# Patient Record
Sex: MALE | Race: WHITE | NOT HISPANIC OR LATINO | Employment: UNEMPLOYED | URBAN - METROPOLITAN AREA
[De-identification: names, ages, dates, MRNs, and addresses within clinical notes are randomized per-mention and may not be internally consistent; named-entity substitution may affect disease eponyms.]

---

## 2017-10-13 ENCOUNTER — OFFICE VISIT (OUTPATIENT)
Dept: NEUROLOGY | Facility: CLINIC | Age: 27
End: 2017-10-13

## 2017-10-13 VITALS — WEIGHT: 200 LBS | BODY MASS INDEX: 26.51 KG/M2 | HEIGHT: 73 IN

## 2017-10-13 DIAGNOSIS — R56.9 GENERALIZED CONVULSIVE SEIZURES (HCC): Primary | ICD-10-CM

## 2017-10-13 PROCEDURE — 99213 OFFICE O/P EST LOW 20 MIN: CPT | Performed by: PSYCHIATRY & NEUROLOGY

## 2017-10-13 RX ORDER — ALPRAZOLAM 2 MG/1
2 TABLET ORAL AS NEEDED
COMMUNITY
Start: 2017-08-12 | End: 2021-02-02

## 2017-10-13 RX ORDER — DIVALPROEX SODIUM 125 MG/1
625 CAPSULE, COATED PELLETS ORAL 2 TIMES DAILY
Qty: 900 CAPSULE | Refills: 3 | Status: SHIPPED | OUTPATIENT
Start: 2017-10-13 | End: 2018-10-12 | Stop reason: SDUPTHER

## 2017-10-13 NOTE — PROGRESS NOTES
Subjective:     Patient ID: Albaro Alvarado is a 27 y.o. male.    History of Present Illness     The patient has had a few more seizures recently.  However Geodon was increased.  No side effects from medication.  Is taken from the patient's parents.  The following portions of the patient's history were reviewed and updated as appropriate: allergies, current medications, past family history, past medical history, past social history, past surgical history and problem list.      Current Outpatient Prescriptions:   •  ALPRAZolam (XANAX) 2 MG tablet, 2 mg As Needed., Disp: , Rfl:   •  Divalproex Sodium (DEPAKOTE SPRINKLE) 125 MG capsule, SPRINKLE 4 CAPSULES BY MOUTH TWO TIMES A DAY AS DIRECTED BY PHYSICIAN, Disp: 720 capsule, Rfl: 2  •  ziprasidone (GEODON) 20 MG capsule, 20 mg 2 (Two) Times a Day With Meals., Disp: , Rfl:     Review of Systems   Constitutional: Negative.    Neurological: Positive for seizures. Negative for dizziness, tremors, syncope, facial asymmetry, speech difficulty, weakness, light-headedness, numbness and headaches.   Psychiatric/Behavioral: Negative.         Objective:    Neurologic Exam  He was examined in the family's minivan.  He is aggressive and mentally handicapped.  Nonverbal.  No pattern of focal weakness.  No tremor is noted.  Self injurious behavior.    Physical Exam    Assessment/Plan:     Albaro was seen today for seizures.    Diagnoses and all orders for this visit:    Generalized convulsive seizures       As his seizures have increased I will try to increase a coat.    Prescription drug management - Depakote 625 mg twice daily sprinkles.  May reduce if problematic.    Follow-up in the office in one year. Thank you for allowing me to share in the care of this patient.  Yeyo Ramirez M.D.

## 2018-10-12 ENCOUNTER — OFFICE VISIT (OUTPATIENT)
Dept: NEUROLOGY | Facility: CLINIC | Age: 28
End: 2018-10-12

## 2018-10-12 DIAGNOSIS — R56.9 GENERALIZED CONVULSIVE SEIZURES (HCC): Primary | ICD-10-CM

## 2018-10-12 PROCEDURE — 99213 OFFICE O/P EST LOW 20 MIN: CPT | Performed by: PSYCHIATRY & NEUROLOGY

## 2018-10-12 RX ORDER — DIVALPROEX SODIUM 125 MG/1
625 CAPSULE, COATED PELLETS ORAL EVERY 12 HOURS SCHEDULED
Qty: 900 CAPSULE | Refills: 3 | Status: SHIPPED | OUTPATIENT
Start: 2018-10-12 | End: 2019-10-11 | Stop reason: SDUPTHER

## 2018-10-12 NOTE — PROGRESS NOTES
Subjective:     Patient ID: Albaro Alvarado is a 28 y.o. male.    Seizures      The patient was seen for follow-up of seizures.  He has a few breakthroughs.  He is having no side effects from the medication.  History was taken from the mother.  Recent valproic acid level was 76.  Liver function tests were normal as was CBC.  The family wishes to make no change.      The following portions of the patient's history were reviewed and updated as appropriate: allergies, current medications, past family history, past medical history, past social history, past surgical history and problem list.      Current Outpatient Prescriptions:   •  ALPRAZolam (XANAX) 2 MG tablet, 2 mg As Needed., Disp: , Rfl:   •  Divalproex Sodium (DEPAKOTE SPRINKLE) 125 MG capsule, Take 5 capsules by mouth Every 12 (Twelve) Hours., Disp: 900 capsule, Rfl: 3  •  ziprasidone (GEODON) 20 MG capsule, 20 mg 2 (Two) Times a Day With Meals., Disp: , Rfl:     Review of Systems   Unable to perform ROS: Psychiatric disorder   Neurological: Positive for seizures.        Objective:    Neurologic Exam  The patient is mentally handicapped.  Nonverbal.  Examined in his minivan.  Funduscopy, eye movements and pupillary reflexes were normal.  Visual fields were symmetric to opticokinetic nystagmus tape  No facial weakness noted.  No pattern of focal weakness.  Gait was unremarkable.  Physical Exam    Assessment/Plan:     Albaro was seen today for seizures.    Diagnoses and all orders for this visit:    Generalized convulsive seizures (CMS/HCC)    Other orders  -     Divalproex Sodium (DEPAKOTE SPRINKLE) 125 MG capsule; Take 5 capsules by mouth Every 12 (Twelve) Hours.         Prescription drug management - meds as above    Follow-up in the office in one year. Thank you for allowing me to share in the care of this patient.  Yeyo Ramirez M.D.

## 2019-10-11 ENCOUNTER — OFFICE VISIT (OUTPATIENT)
Dept: NEUROLOGY | Facility: CLINIC | Age: 29
End: 2019-10-11

## 2019-10-11 DIAGNOSIS — R56.9 GENERALIZED CONVULSIVE SEIZURES (HCC): Primary | ICD-10-CM

## 2019-10-11 PROCEDURE — 99213 OFFICE O/P EST LOW 20 MIN: CPT | Performed by: PSYCHIATRY & NEUROLOGY

## 2019-10-11 RX ORDER — DIVALPROEX SODIUM 125 MG/1
625 CAPSULE, COATED PELLETS ORAL EVERY 12 HOURS SCHEDULED
Qty: 900 CAPSULE | Refills: 3 | Status: SHIPPED | OUTPATIENT
Start: 2019-10-11 | End: 2020-10-26 | Stop reason: SDUPTHER

## 2019-10-11 NOTE — PROGRESS NOTES
Subjective:     Patient ID: Albaro Alvarado is a 29 y.o. male.    History of Present Illness  Patient was seen back for follow-up of seizures.  Only a few breakthroughs over the past year.  He is on Depakote sprinkles 625 mg twice daily without side effects.  Valproic acid level in August was 72.  Has a problem with low thyroid and is having trouble getting his medicines adjusted.    The following portions of the patient's history were reviewed and updated as appropriate: allergies, current medications, past family history, past medical history, past social history, past surgical history and problem list.      Current Outpatient Medications:   •  ALPRAZolam (XANAX) 2 MG tablet, 2 mg As Needed., Disp: , Rfl:   •  Divalproex Sodium (DEPAKOTE SPRINKLE) 125 MG capsule, Take 5 capsules by mouth Every 12 (Twelve) Hours., Disp: 900 capsule, Rfl: 3  •  ziprasidone (GEODON) 20 MG capsule, 20 mg 2 (Two) Times a Day With Meals., Disp: , Rfl:     Review of Systems   Constitutional: Negative for activity change, appetite change and fatigue.   HENT: Negative for facial swelling, trouble swallowing and voice change.    Eyes: Negative for photophobia, pain and visual disturbance.   Respiratory: Negative for chest tightness, shortness of breath and wheezing.    Cardiovascular: Negative for chest pain, palpitations and leg swelling.   Endocrine: Negative for polydipsia and polyphagia.   Musculoskeletal: Negative for back pain, gait problem, neck pain and neck stiffness.   Skin: Negative for rash and wound.   Allergic/Immunologic: Negative for environmental allergies.   Neurological: Negative for dizziness, tremors, seizures (unchanged since last seen), syncope, facial asymmetry, speech difficulty, weakness, light-headedness, numbness and headaches.   Hematological: Does not bruise/bleed easily.   Psychiatric/Behavioral: Negative for agitation, behavioral problems, confusion, decreased concentration, dysphoric mood, hallucinations,  self-injury, sleep disturbance and suicidal ideas. The patient is not nervous/anxious and is not hyperactive.           I have reviewed ROS completed by medical assistant.     Objective:    Neurologic Exam  Patient is mentally handicapped.  The patient was examined in the parking lot he has behavioral issues.  No pattern of focal weakness.  For funndoscopy visual fields and pupillary reflexes were normal.  Gait was not tested.  Poorly controlled behavior.  Physical Exam    Assessment/Plan:     Albaro was seen today for seizures.    Diagnoses and all orders for this visit:    Generalized convulsive seizures (CMS/HCC)    Other orders  -     Divalproex Sodium (DEPAKOTE SPRINKLE) 125 MG capsule; Take 5 capsules by mouth Every 12 (Twelve) Hours.       Prescription drug management - meds as above    Follow-up in the office in one year. Thank you for allowing me to share in the care of this patient.  Yeyo Ramirez M.D.

## 2020-10-22 ENCOUNTER — TELEPHONE (OUTPATIENT)
Dept: NEUROLOGY | Facility: CLINIC | Age: 30
End: 2020-10-22

## 2020-10-22 NOTE — TELEPHONE ENCOUNTER
PATIENT'S MOTHER BHAVANA CALLED TO REQUEST F/U APPOINTMENT FOR PATIENT.  LAST SEEN BY DR LINDA 10-11-19.    PLEASE CALL: [phone], BEST AFTER 4PM.  LEAVE VM IF NO ANSWER

## 2020-10-26 NOTE — TELEPHONE ENCOUNTER
[facility] will you fill patient RX, Dr Torres patient?  Thank you      Spoke with patient's mother and gave her Dr Seipel's name and Dr Escalante's she will call back about who she would like patient to follow up with.    Patient does need 1 month refill.

## 2020-10-27 RX ORDER — DIVALPROEX SODIUM 125 MG/1
625 CAPSULE, COATED PELLETS ORAL EVERY 12 HOURS SCHEDULED
Qty: 900 CAPSULE | Refills: 0 | Status: SHIPPED | OUTPATIENT
Start: 2020-10-27 | End: 2020-12-21 | Stop reason: SDUPTHER

## 2020-10-27 NOTE — TELEPHONE ENCOUNTER
Patients mother Lillian called in and stated that the prescription that was called in will not work due to [facility] not accepting [place] Medicaid. The pharmacy states that an [place] provider would have to sign the prescription and she is wanting to know if this is something that we can do.     Also she states that she would like to see Dr Escalante as long as [place] Medicaid is not going to be an issue and to see if she could meet or speak to Dr Escalante prior to bringing her son to meet her and wants to just make sure that Dr Escalante understands what patient is about.     Apparently Dr Ramirez would go out to patients care to have appts due to an issue that occurred once in office.     Lillian   [phone]

## 2020-10-28 NOTE — TELEPHONE ENCOUNTER
I can see the patient for seizures.  I would check with Laurie to make sure that I am covered with [place] Medicaid.  I do not feel comfortable doing a clinic visit without the patient involved.  I'm not sure if I can even bill for such services.

## 2020-10-28 NOTE — TELEPHONE ENCOUNTER
Please review previous msg and advise if you are willing to take over care for the patient.    Thank you

## 2020-12-21 ENCOUNTER — TELEMEDICINE (OUTPATIENT)
Dept: NEUROLOGY | Facility: CLINIC | Age: 30
End: 2020-12-21

## 2020-12-21 DIAGNOSIS — Z79.899 HIGH RISK MEDICATION USE: ICD-10-CM

## 2020-12-21 DIAGNOSIS — R56.9 GENERALIZED CONVULSIVE SEIZURES (HCC): Primary | ICD-10-CM

## 2020-12-21 PROCEDURE — 99214 OFFICE O/P EST MOD 30 MIN: CPT | Performed by: PSYCHIATRY & NEUROLOGY

## 2020-12-21 RX ORDER — SERTRALINE HYDROCHLORIDE 20 MG/ML
50 SOLUTION ORAL DAILY
COMMUNITY
Start: 2020-12-10 | End: 2022-05-25 | Stop reason: SDUPTHER

## 2020-12-21 RX ORDER — ALPRAZOLAM 1 MG/1
TABLET ORAL
COMMUNITY
Start: 2020-12-14 | End: 2021-02-02

## 2020-12-21 RX ORDER — DIVALPROEX SODIUM 125 MG/1
625 CAPSULE, COATED PELLETS ORAL EVERY 12 HOURS SCHEDULED
Qty: 900 CAPSULE | Refills: 3 | Status: SHIPPED | OUTPATIENT
Start: 2020-12-21 | End: 2021-02-02 | Stop reason: SDUPTHER

## 2020-12-21 NOTE — PROGRESS NOTES
Subjective   Albaro Alvarado is a 30 y.o. male.     I performed this clinical encounter by utilizing a real-time telehealth video connection between my location and the patient's location at home.  I obtained verbal consent from the patient to perform this clinical encounter utilizing video and prepared the patient by answering any questions they had about the telehealth interaction.    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  The patient was last seen a year ago by Dr. Ramirez.  He is accompanied by his mother Lillian who is also his guardian.  Seizures started at age 2.  Seizures now are rare.  Hasn't had a GTCS (had 2 that lasted 4 minutes) since .  Has smaller seizures that look like a shiver and lasts about 30 seconds.  Has one per month.  Last one was a month ago.  No seizure triggers.  Maybe when he is concentrating on something.  Has a little tremor too.  Was off ASMs for 4-5 years, but little seizures recurred.  Had smaller seizures, up to 20 in one day.  Was placed back on meds.   Pregnancy was not complicated.  Had a  because his heart rate was dropping, was full term.  Went home with mom.  The patient is currently using Depakote sprinkles.  He takes 5 capsules in the morning and 5 capsules at night.  He has been on this medication for 20 years.  They deny any higher doses.  He does have some tremor.  They report that the medication is affordable.  He had an EEG in  that showed multifocal spikes.  Mom feels like if he had to come into the office he would have a meltdown.  They also report that he had blood work done this past summer.  She has copies that she can send to us.  Overall mom would like to keep things the way they are and she feels like the patient is doing fairly well.    Occur at a particular time of day? no  Clusters? Not in  a very long time  Status? no  Injuries from seizures? no    Risk factors:  Head trauma/pathology? no  CNS infections? no  Family history of seizures? no       The following portions of the patient's history were reviewed and updated as appropriate: allergies, current medications, past family history, past medical history, past social history, past surgical history and problem list.    Review of Systems    Objective   Physical Exam      Assessment/Plan   Problems Addressed this Visit        Nervous and Auditory    Generalized convulsive seizures (CMS/HCC) - Primary    Relevant Orders    DEXA Bone Density Axial      Other Visit Diagnoses     High risk medication use        Relevant Orders    DEXA Bone Density Axial      Diagnoses       Codes Comments    Generalized convulsive seizures (CMS/HCC)    -  Primary ICD-10-CM: R56.9  ICD-9-CM: 780.39     High risk medication use     ICD-10-CM: Z79.899  ICD-9-CM: V58.69         The patient is a 30-year-old male with history of Angelman syndrome and epilepsy who presents today for evaluation.    1.  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  I would recommend a CBC, liver enzymes, and a Depakote level for drug monitoring.  I would also like to get a DEXA scan as well.  We will see the patient back in 1 years time or sooner if needed.  Hopefully we can continue to do telemedicine visits due to his concerning behaviors if he were to come to the clinic.    A total of 25 minutes of face-to-face time was spent with the patient and his mother and greater than 50% time was spent on counseling regarding his symptoms and medication management.    **We received the results of the patient's most recent blood work.  His CMP and CBC were normal.**

## 2020-12-22 ENCOUNTER — PATIENT MESSAGE (OUTPATIENT)
Dept: NEUROLOGY | Facility: CLINIC | Age: 30
End: 2020-12-22

## 2021-01-15 ENCOUNTER — TELEPHONE (OUTPATIENT)
Dept: NEUROLOGY | Facility: CLINIC | Age: 31
End: 2021-01-15

## 2021-01-15 ENCOUNTER — HOSPITAL ENCOUNTER (OUTPATIENT)
Dept: BONE DENSITY | Facility: HOSPITAL | Age: 31
Discharge: HOME | End: 2021-01-15
Admitting: PSYCHIATRY & NEUROLOGY

## 2021-01-15 DIAGNOSIS — R56.9 GENERALIZED CONVULSIVE SEIZURES (HCC): ICD-10-CM

## 2021-01-15 DIAGNOSIS — Z79.899 HIGH RISK MEDICATION USE: ICD-10-CM

## 2021-01-15 PROCEDURE — 77080 DXA BONE DENSITY AXIAL: CPT

## 2021-01-15 NOTE — TELEPHONE ENCOUNTER
Pts mother called in stating she needs her sons bone density scan that was completed today at [facility], faxed over to Dr. Perry. States she does not have a fax number.     Please advise   Dr. Perry # - [phone]  Address: [address] [place] [address]    Call back number [phone]

## 2021-01-22 ENCOUNTER — TELEPHONE (OUTPATIENT)
Dept: NEUROLOGY | Facility: CLINIC | Age: 31
End: 2021-01-22

## 2021-01-22 DIAGNOSIS — Z79.899 HIGH RISK MEDICATION USE: ICD-10-CM

## 2021-01-22 DIAGNOSIS — R56.9 GENERALIZED CONVULSIVE SEIZURES (HCC): Primary | ICD-10-CM

## 2021-01-22 NOTE — TELEPHONE ENCOUNTER
Spoke to pt's mother. She is agreeable with being referred to see a provider in Dr. Seipel's office for future care.     Please review and and sign attached referral.    Thank you

## 2021-01-26 NOTE — TELEPHONE ENCOUNTER
PT'S MOTHER HAS CALLED REQUESTING TO SPEAK WITH KAREN. SHE WANTED TO CONSULT ABOUT DEPAKOTE MEDICATION AND WANTED TO MAKE HER AWARE THAT THE DEPAKOTE MEDICATION HAS BEEN FILLED. PHARMACY HAD STATED THAT GENERIC WOULDN'T BE FILLED BUT NAME BRAND HAS BEEN FILLED AT NO COST. SHE STATES SHE IS UNSURE IF THIS WILL BE A RECURRING ISSUE OR IF THEY NO LONGER NEED TO LOOK INTO BE SEEN AT DR. SEIPEL'S OFFICE.    PT'S MOTHER CAN BE REACHED AT [phone].    PLEASE REVIEW AND ADVISE.

## 2021-01-26 NOTE — TELEPHONE ENCOUNTER
Returned call to pt's mother. Explained that referral had been placed to Dr. Boothe's office, and that unfortunately there were several plans of [place] medicaid that at this time Dr. Escalante nor any other provider in our [place] office are participating with. She stated understanding and will go forward with transferring to Dr. Boothe's office.

## 2021-02-02 ENCOUNTER — OFFICE VISIT (OUTPATIENT)
Dept: NEUROLOGY | Facility: CLINIC | Age: 31
End: 2021-02-02

## 2021-02-02 VITALS — BODY MASS INDEX: 27.09 KG/M2 | WEIGHT: 200 LBS | HEIGHT: 72 IN

## 2021-02-02 DIAGNOSIS — R56.9 GENERALIZED CONVULSIVE SEIZURES (HCC): Primary | ICD-10-CM

## 2021-02-02 DIAGNOSIS — M81.0 OSTEOPOROSIS WITHOUT CURRENT PATHOLOGICAL FRACTURE, UNSPECIFIED OSTEOPOROSIS TYPE: ICD-10-CM

## 2021-02-02 PROCEDURE — 99212 OFFICE O/P EST SF 10 MIN: CPT | Performed by: PSYCHIATRY & NEUROLOGY

## 2021-02-02 RX ORDER — DIVALPROEX SODIUM 125 MG/1
125 CAPSULE, COATED PELLETS ORAL EVERY 12 HOURS SCHEDULED
Qty: 900 CAPSULE | Refills: 3 | Status: SHIPPED | OUTPATIENT
Start: 2021-02-02 | End: 2022-01-04 | Stop reason: SDUPTHER

## 2021-02-02 NOTE — PROGRESS NOTES
Tele [facility]  Telephone  You have chosen to receive care through a telephone visit. Do you consent to use a telephone visit for your medical care today? Yes    TIME 13 min      Chief Complaint  Seizures (previously seen by Dr. Ramirez)    Subjective          Albaro Alvarado presents to [facility] for   Generalized convulsive seizures, treated with Depakote sprinkle 625 mg twice daily without side effects. Patient is doing well with medication only a few break through seizures. Patient PCP ordered a Dexa scan and shows osteoporosis patients mother wants to consult about his medication.     Pt had drop attacks with falls none on depakote  Has had two grandmal seizures none since 1996          Current Outpatient Medications:   •  Divalproex Sodium (DEPAKOTE SPRINKLE) 125 MG capsule, Take 1 capsule by mouth Every 12 (Twelve) Hours., Disp: 900 capsule, Rfl: 3  •  sertraline (ZOLOFT) 20 MG/ML concentrated solution, Take 50 mg by mouth Daily., Disp: , Rfl:   •  ziprasidone (GEODON) 20 MG capsule, 20 mg 2 (Two) Times a Day With Meals., Disp: , Rfl:     Review of Systems   Constitutional: Negative for activity change, appetite change and fatigue.   HENT: Negative for facial swelling, trouble swallowing and voice change.    Eyes: Negative for photophobia, pain and visual disturbance.   Respiratory: Negative for chest tightness, shortness of breath and wheezing.    Cardiovascular: Negative for chest pain, palpitations and leg swelling.   Gastrointestinal: Negative for constipation and diarrhea.   Endocrine: Negative for polydipsia and polyphagia.   Genitourinary: Negative for difficulty urinating and frequency.   Musculoskeletal: Negative for back pain, gait problem, neck pain and neck stiffness.   Skin: Negative for rash and wound.   Allergic/Immunologic: Negative for environmental allergies.   Neurological: Positive for seizures (unchanged since last seen) and speech difficulty. Negative for  "dizziness, tremors, syncope, facial asymmetry, weakness, light-headedness, numbness and headaches.   Hematological: Does not bruise/bleed easily.   Psychiatric/Behavioral: Positive for behavioral problems. Negative for agitation, confusion, decreased concentration, dysphoric mood, hallucinations, self-injury, sleep disturbance and suicidal ideas. The patient is not nervous/anxious and is not hyperactive.           Objective:    Vital Signs:   Ht 182.9 cm (72\")   Wt 90.7 kg (200 lb)   BMI 27.12 kg/m²     Physical Exam   Result Review :                Pt non verbal, phone call with pts mother     Assessment and Plan    Diagnoses and all orders for this visit:    1. Generalized convulsive seizures (CMS/HCC) (Primary)    2. Osteoporosis without current pathological fracture, unspecified osteoporosis type    Other orders  -     Divalproex Sodium (DEPAKOTE SPRINKLE) 125 MG capsule; Take 1 capsule by mouth Every 12 (Twelve) Hours.  Dispense: 900 capsule; Refill: 3       Continue Depakote at current dose as this has been effective at preventing all seizures and change to alternative med risk seizures with bone fractures with the osteoporosis. And also most anticonvulsants can lead to osteoporosis.     Fu with pcp for treatment of osteoporisis        Follow Up   Return in about 1 year (around 2/2/2022).  Patient was given instructions and counseling regarding his condition or for health maintenance advice. Please see specific information pulled into the AVS if appropriate.             This document has been electronically signed by Joseph Seipel, MD on February 2, 2021 16:47 EST      "

## 2021-05-12 NOTE — TELEPHONE ENCOUNTER
From: Albaro Alvarado  To: Monika Escalante MD  Sent: 12/22/2020 3:23 PM EST  Subject: Visit Follow-Up Question    These are the most recent lab results for Albaro Alvarado

## 2021-12-22 RX ORDER — DIVALPROEX SODIUM 125 MG/1
125 CAPSULE, COATED PELLETS ORAL EVERY 12 HOURS SCHEDULED
Qty: 900 CAPSULE | Refills: 3 | OUTPATIENT
Start: 2021-12-22

## 2021-12-22 NOTE — TELEPHONE ENCOUNTER
Please schedule pts fu visit for feb  Pt seen feb 2021   Med refilled for one year at feb 2021 visit  Will refill at time of fu visit

## 2021-12-22 NOTE — TELEPHONE ENCOUNTER
Caller:  BHAVANA HERNANDEZ    Relationship: PT'S MOTHER    Best call back number: [phone]    Requested Prescriptions: Divalproex Sodium (DEPAKOTE SPRINKLE) 125 MG capsule  Requested Prescriptions      No prescriptions requested or ordered in this encounter        [facility] where request [facility] be sent[facility]     Additional details provided by patient: [facility]ACH[facility] ALWAYS GAVE A  90DAYS SUPPLY WITH A 1 YEAR PRESCRIPTION FOR THE Divalproex Sodium (DEPAKOTE SPRINKLE) 125 MG capsule. PT HAS ABOUT 1 MONTH LEFT OF THE MEDICATION.     Does the patient have less than a 3 day supply:  [] Yes  [x] No    Leonor Good Rep   12/22/21 08:46 EST

## 2022-01-04 RX ORDER — DIVALPROEX SODIUM 125 MG/1
125 CAPSULE, COATED PELLETS ORAL EVERY 12 HOURS SCHEDULED
Qty: 900 CAPSULE | Refills: 3 | Status: SHIPPED | OUTPATIENT
Start: 2022-01-04 | End: 2022-01-13 | Stop reason: SDUPTHER

## 2022-01-13 ENCOUNTER — TELEPHONE (OUTPATIENT)
Dept: NEUROLOGY | Facility: CLINIC | Age: 32
End: 2022-01-13

## 2022-01-13 RX ORDER — DIVALPROEX SODIUM 125 MG/1
CAPSULE, COATED PELLETS ORAL
Qty: 900 CAPSULE | Refills: 3 | Status: SHIPPED | OUTPATIENT
Start: 2022-01-13 | End: 2022-01-17

## 2022-01-13 NOTE — TELEPHONE ENCOUNTER
Dion called to verify depakote sprinkles RX. For years patient has been on 125mg 5 per day and the Rx was sent for 1 every 12 hours. Please update and send in Rx.    no

## 2022-01-17 RX ORDER — DIVALPROEX SODIUM 125 MG
CAPSULE, DELAYED RELEASE SPRINKLE ORAL
Qty: 900 CAPSULE | Refills: 3 | Status: SHIPPED | OUTPATIENT
Start: 2022-01-17 | End: 2022-05-25 | Stop reason: SDUPTHER

## 2022-05-24 NOTE — PROGRESS NOTES
Tele health  Video  You have chosen to receive care through a telehealth visit.  Do you consent to use a video/audio connection for your medical care today? No  Chief Complaint  Seizures    Subjective          Albaro Alvarado presents to [facility] for Seizure  History of Present Illness    Patient is here to f/u on seizure’s per patient mother his last seizure was years ago       patient is currently taking depakote 125mg sprinkles 5 q12 hrs      Patient mother states he does have tremors from medication but was advised by cole      If tremors did not effects daily activities then there was no need to add anything.    Pt having behavior problems, working with psychiatrist / therapist and medications.   ===previous ov 2/2/21 dr seipel=====  Generalized convulsive seizures, treated with Depakote sprinkle 625 mg twice daily without side effects. Patient is doing well with medication only a few break through seizures. Patient PCP ordered a Dexa scan and shows osteoporosis patients mother wants to consult about his medication.      Pt had drop attacks with falls none on depakote  Has had two grandmal seizures none since 1996     Continue Depakote at current dose as this has been effective at preventing all seizures and change to alternative med risk seizures with bone fractures with the osteoporosis. And also most anticonvulsants can lead to osteoporosis.      Fu with pcp for treatment of osteoporisis        Current Outpatient Medications:   •  ALPRAZolam (XANAX) 1 MG tablet, , Disp: , Rfl:   •  Calcium Carbonate-Vitamin D3 600-400 MG-UNIT tablet, , Disp: , Rfl:   •  denosumab (Prolia) 60 MG/ML solution prefilled syringe syringe, , Disp: , Rfl:   •  Depakote Sprinkles 125 MG capsule, TAKE FIVE CAPSULES BY MOUTH EVERY 12 HOURS, Disp: 900 capsule, Rfl: 3  •  sertraline (ZOLOFT) 100 MG tablet, , Disp: , Rfl:   •  ziprasidone (GEODON) 80 MG capsule, , Disp: , Rfl:     Review of Systems    Unable to perform ROS: Patient nonverbal   Constitutional: Negative for fatigue.   Eyes: Negative for visual disturbance.   Respiratory: Negative for chest tightness and shortness of breath.    Cardiovascular: Negative for chest pain.   Gastrointestinal: Negative for abdominal distention and abdominal pain.   Genitourinary: Negative for frequency and urgency.   Musculoskeletal: Negative for neck pain and neck stiffness.   Neurological: Negative for dizziness and headaches.   Psychiatric/Behavioral: Positive for agitation and decreased concentration.      Ros done by mother to best of her knowledge      Objective:    Vital Signs:   There were no vitals taken for this visit.    Physical Exam  Vitals reviewed.   Neurological:      Mental Status: He is alert. Mental status is at baseline.        Result Review :                    Assessment and Plan    Diagnoses and all orders for this visit:    1. Generalized convulsive seizures (HCC) (Primary)    2. Angelman's syndrome    Other orders  -     Depakote Sprinkles 125 MG capsule; TAKE FIVE CAPSULES BY MOUTH EVERY 12 HOURS  Dispense: 900 capsule; Refill: 3    Patient doing well without seizures on the current dose of Depakote 1250 mg/day.  As the Depakote dose is relatively low considering his weight of over 200 pounds if indicated for behavioral reasons the Depakote dose could be increased.  He is to return for follow-up visit in 1 year.    Follow Up   Return in about 1 year (around 5/25/2023).  Patient was given instructions and counseling regarding his condition or for health maintenance advice. Please see specific information pulled into the AVS if appropriate.     This document has been electronically signed by Joseph Seipel, MD on May 27, 2022 09:41 EDT

## 2022-05-25 ENCOUNTER — TELEMEDICINE (OUTPATIENT)
Dept: NEUROLOGY | Facility: CLINIC | Age: 32
End: 2022-05-25

## 2022-05-25 DIAGNOSIS — Q93.51 ANGELMAN'S SYNDROME: ICD-10-CM

## 2022-05-25 DIAGNOSIS — R56.9 GENERALIZED CONVULSIVE SEIZURES: Primary | ICD-10-CM

## 2022-05-25 PROCEDURE — 99213 OFFICE O/P EST LOW 20 MIN: CPT | Performed by: PSYCHIATRY & NEUROLOGY

## 2022-05-25 RX ORDER — DENOSUMAB 60 MG/ML
INJECTION SUBCUTANEOUS
COMMUNITY
Start: 2021-05-01

## 2022-05-25 RX ORDER — ALPRAZOLAM 1 MG/1
TABLET ORAL
COMMUNITY
Start: 2021-05-01

## 2022-05-25 RX ORDER — DIVALPROEX SODIUM 125 MG
CAPSULE, DELAYED RELEASE SPRINKLE ORAL
Qty: 900 CAPSULE | Refills: 3 | Status: SHIPPED | OUTPATIENT
Start: 2022-05-25

## 2022-05-25 RX ORDER — SERTRALINE HYDROCHLORIDE 100 MG/1
TABLET, FILM COATED ORAL
COMMUNITY
Start: 2022-05-18

## 2022-05-27 RX ORDER — ZIPRASIDONE HYDROCHLORIDE 80 MG/1
CAPSULE ORAL
COMMUNITY
Start: 2022-04-21

## 2022-09-06 ENCOUNTER — TELEPHONE (OUTPATIENT)
Dept: NEUROLOGY | Facility: CLINIC | Age: 32
End: 2022-09-06

## 2022-09-06 NOTE — TELEPHONE ENCOUNTER
The report on his behavior noted.  Follow up with the psychiatrist.  If the psychiatrist wishes increase the Depakote that is ok with me,  His level last reported in epic was 75, therapeutic between 50 and 125

## 2022-09-06 NOTE — TELEPHONE ENCOUNTER
Provider: DR. SEIPEL    Caller: Lillian Crow    Relationship to Patient: Mother    Pharmacy: [facility] [address] [place] [address] [address] [facility] [address] - [phone] [facility] [phone] FX    Phone Number: [phone]    Reason for Call: CHANGES IN BEHAVIOR, ODD BEHAVIORS    When was the patient last seen: 5/25/2022    When is the patient's next appointment: NO FUTURE APPTS    When did it start: STARTED ABOUT 1 YEAR AGO BUT HAS WORSENED SIGNIFICANTLY IN THE LAST 6 MONTHS    Characteristics of symptom/severity: PT HAVING VIOLENT OUTBURSTS TOWARDS OTHERS AS WELL AS HIMSELF. CRYING, HAIR PULLING, BITING, HITTING, KICKING. PT HAS HIT HIMSELF IN THE HEAD, BITES HIS HANDS, AND FEET. HAS BIT AND PULLED OFF 3 OF HIS TOENAILS.     Timing- Is it constant or intermittent: INTERMITTENT- HAPPENS VERY RAPIDLY AND LASTS AVERAGELY 15 MINS. LONGEST EPISODE HAS LASTED APPROXIMATELY 2 HOURS. HAPPENS 3-4 TIMES DAILY.    What therapies/medications have you tried: PT WAS ON GEODON (WHICH THEY TRIED INCREASING), XANAX, SERTRALINE, ATIVAN, VALIUM, AND ZYPREXA.     PT CURRENTLY ON A COCKTAIL OF SERTRALINE, ATIVAN, ZYPREXA, AND ONE OTHER MEDICATION THAT PT'S MOTHER COULD NOT REMEMBER THE NAME OF. PT STILL TAKING DEPAKOTE MEDICATION AS PRESCRIBED BY DR. SEIPEL.    PT'S PSYCHIATRIST, EMI FORTUNE NP, HAS RECOMMENDED PT CONTACT PT'S NEUROLOGIST TO MAKE AWARE OF BEHAVIORAL CHANGES.    **PT IS COMPLETELY NON-VERBAL & MENTALLY HANDICAP SO MOTHER STATES COMMUNICATION WITH PT IS DIFFICULT TO DETERMINE THE CAUSE OF HIS OUTBURSTS. PT'S MOTHER DENIES ANY RECENT SEIZURES. SHE NOTES THAT PT DOES OCCASSIONALLY HAS A BREAKTHROUGH SEIZURE THAT LASTS NO MORE THAN A COUPLE OF SECONDS BEFORE RETURNING TO PT'S USUAL BASELINE.    PLEASE REVIEW AND ADVISE.

## 2022-11-09 ENCOUNTER — TELEPHONE (OUTPATIENT)
Dept: NEUROLOGY | Facility: CLINIC | Age: 32
End: 2022-11-09

## 2022-11-09 DIAGNOSIS — Q93.51 ANGELMAN'S SYNDROME: Primary | ICD-10-CM

## 2022-11-09 DIAGNOSIS — R56.9 GENERALIZED CONVULSIVE SEIZURES: ICD-10-CM

## 2022-11-09 NOTE — TELEPHONE ENCOUNTER
Provider: SEIPEL, JOSEPH, MD    Caller: SIA    Relationship to Patient: [facility]    Phone Number: [phone]    Reason for Call: REQUESTING A REFERRAL & MEDICAL RECORDS FOR NEW NEUROLOGIST TO DR SAMANTHA PETER AT [facility]      STATED SHE IS WANTING A 2ND OPINION REGARDING THIS REFERRAL.        FAX NUMBER [phone]    PLEASE ADVISE

## 2022-11-15 ENCOUNTER — TELEPHONE (OUTPATIENT)
Dept: NEUROLOGY | Facility: CLINIC | Age: 32
End: 2022-11-15

## 2022-11-15 NOTE — TELEPHONE ENCOUNTER
Provider: DR. SEIPEL  Caller: PANDA WITH [facility] EPILEPSY [place]  Relationship to Patient: N/A  Pharmacy: N/A  Phone Number: [phone]  Reason for Call: REFERRAL WAS SENT TO WRONG LOCATION.    PLEASE CALL PANDA AND ADVISE    THANK YOU

## 2023-04-05 NOTE — TELEPHONE ENCOUNTER
----- Message from Laurie Moraes sent at 1/22/2021 12:39 PM EST -----  Regarding: RE: Insurance Issue  I will request her to be credentialed with this ins. There are so many [place] medicaid's. I believe we should transfer care to [facility]. They have a NP now.  ----- Message -----  From: Yennifer Silva MA  Sent: 1/22/2021  11:04 AM EST  To: Laurie Moraes  Subject: Insurance Issue                                  I am unable to get pt's medications approved under Dr. Escalante due to insurance stating she is not a participating provider under [facility]? I have tried talking to insurance and the pharmacy. Pt's mother is aware of situation and has requested a refill from pt's PCP in the meantime. Do you know exactly what insurance from [place] [employer] Dr. Escalante is in network with? Do you recommend that we just get patient referred to Dr. Seipel's office?    Please review.  Thank you       less than 2 seconds

## 2024-11-01 ENCOUNTER — TRANSCRIBE ORDERS (OUTPATIENT)
Dept: GENERAL RADIOLOGY | Facility: HOSPITAL | Age: 34
End: 2024-11-01
Payer: MEDICAID

## 2024-11-01 DIAGNOSIS — R31.0 GROSS HEMATURIA: Primary | ICD-10-CM
